# Patient Record
Sex: FEMALE | Race: WHITE | NOT HISPANIC OR LATINO | Employment: STUDENT
[De-identification: names, ages, dates, MRNs, and addresses within clinical notes are randomized per-mention and may not be internally consistent; named-entity substitution may affect disease eponyms.]

---
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
## 2022-08-15 ENCOUNTER — HOSPITAL ENCOUNTER (EMERGENCY)
Facility: HOSPITAL | Age: 22
Discharge: HOME | End: 2022-08-15
Admitting: NURSE PRACTITIONER
Payer: COMMERCIAL

## 2022-08-15 VITALS
WEIGHT: 130 LBS | DIASTOLIC BLOOD PRESSURE: 80 MMHG | TEMPERATURE: 98.9 F | SYSTOLIC BLOOD PRESSURE: 137 MMHG | OXYGEN SATURATION: 98 % | BODY MASS INDEX: 21.66 KG/M2 | RESPIRATION RATE: 16 BRPM | HEIGHT: 65 IN | HEART RATE: 71 BPM

## 2022-08-15 DIAGNOSIS — S61.219A FINGER LACERATION: ICD-10-CM

## 2022-08-15 PROCEDURE — 12001 RPR S/N/AX/GEN/TRNK 2.5CM/<: CPT

## 2022-08-15 PROCEDURE — 99283 EMERGENCY DEPT VISIT LOW MDM: CPT

## 2022-08-15 ASSESSMENT — ENCOUNTER SYMPTOMS: NUMBNESS: 0

## 2022-08-16 NOTE — ED PROVIDER NOTES
EMERGENCY DEPARTMENT ENCOUNTER      NAME: Mary Henry  AGE: 22 year old female  YOB: 2000  MRN: [id number]  EVALUATION DATE & TIME: No admission date for patient encounter.    PCP: No Ref-Primary, Physician    ED PROVIDER: HIMANSHU Lopez, CNP      Chief Complaint   Patient presents with     Laceration     R Pinky finger         FINAL IMPRESSION:  1. Finger laceration          ED COURSE & MEDICAL DECISION MAKIN:55 PM I met with the patient, obtained history, performed an initial exam, and discussed options and plan for treatment here in the ED.  10:16 PM Laceration repaired. Discussed plan for discharge.     Pertinent Labs & Imaging studies reviewed. (See chart for details)  22 year old female presents to the Emergency Department for evaluation of finger laceration. Tetanus UTD. Wound repaired and bandaged. No FB on exam. CMS intact. Given instructions regarding ongoing wound management. Also discussed return precautions.     At the conclusion of the encounter I discussed the results of all of the tests and the disposition. The questions were answered. The patient or family acknowledged understanding and was agreeable with the care plan.       PPE worn: n95 mask.     MEDICATIONS GIVEN IN THE EMERGENCY:  Medications - No data to display    NEW PRESCRIPTIONS STARTED AT TODAY'S ER VISIT  New Prescriptions    No medications on file            =================================================================    HPI    Patient information was obtained from: patient     Use of Intrepreter: N/A       Mary Henry is a 22 year old female otherwise healthy who presents for evaluation of a finger laceration.     Patient presents with a laceration to her right 5th digit after accidentally cutting her finger with a knife while doing dishes this evening. This occurred shortly prior to arrival. She was able to control the bleeding. Patient reports constant pain localized to the affected  digit. Notes some tingling attributed to the pressure bandage. No distal numbness. Denies any other symptoms at this time. Last Tdap 2018. No other complaints or concerns expressed at this time.      REVIEW OF SYSTEMS   Review of Systems   Musculoskeletal:        Positive for right 5th digit pain.   Skin:        Positive for laceration (right 5th digit).   Neurological: Negative for numbness.        Positive for tingling.   All other systems reviewed and are negative.       PAST MEDICAL HISTORY:  Past Medical History:   Diagnosis Date     Foot and mouth disease 7/2004       PAST SURGICAL HISTORY:  History reviewed. No pertinent surgical history.        CURRENT MEDICATIONS:    No current facility-administered medications for this encounter.     No current outpatient medications on file.         ALLERGIES:  Allergies   Allergen Reactions     Penicillins Hives       FAMILY HISTORY:  Family History   Problem Relation Age of Onset     Alcohol/Drug Mother      Allergies Mother      Depression Mother      Breast Cancer Father      Alcohol/Drug Maternal Grandmother      Allergies Maternal Grandmother      Depression Maternal Grandmother      Alcohol/Drug Maternal Grandfather      Allergies Maternal Grandfather      Depression Maternal Grandfather      C.A.D. Paternal Grandmother      Diabetes Paternal Grandmother      Hypertension Paternal Grandmother      Breast Cancer Paternal Grandmother        SOCIAL HISTORY:   Social History     Socioeconomic History     Marital status: Single     Spouse name: None     Number of children: None     Years of education: None     Highest education level: None   Tobacco Use     Smoking status: Never Smoker     Smokeless tobacco: Never Used     Tobacco comment: Not exposed   Substance and Sexual Activity     Alcohol use: No     Drug use: No     Sexual activity: Yes     Partners: Male     Birth control/protection: Condom, Injection         VITALS:  Patient Vitals for the past 24 hrs:   BP  "Temp Temp src Pulse Resp SpO2 Height Weight   08/15/22 2121 137/80 -- -- -- -- -- -- --   08/15/22 2118 -- 98.9  F (37.2  C) Temporal 71 16 98 % 1.651 m (5' 5\") 59 kg (130 lb)       PHYSICAL EXAM    Constitutional:  Well developed, well nourished, no distress   HENT:  Atraumatic  Respiratory:  Normal, nonlabored respirations  Musculoskeletal:  Able to move all digits. All 3 layers of tendon function intact. Intrinsic muscle function of the right thumb intact. Motor and sensory function in the radial, medial, and ulnar distributions intact.  Integument:  Well hydrated, 2 cm U shaped laceration on the pad of the right 5th digit. No tendon involvement. No foreign body.  Neurologic:  Alert and oriented x 3                LAB:  All pertinent labs reviewed and interpreted.  Labs Ordered and Resulted from Time of ED Arrival to Time of ED Departure - No data to display      RADIOLOGY:  Reviewed all pertinent imaging. Please see official radiology report.  No orders to display             PROCEDURES:     PROCEDURE: Digital Block   INDICATIONS: Laceration   PROCEDURE PROVIDER: Luis Fernando Faust CNP   SITE: Right little finger (5th digit)   MEDICATION: 3 mL of 1% Lidocaine without epinephrine   NOTE: The skin overlying the site for injection was prepped with chlorhexidine.  Needle was inserted in a standard three point injection pattern.  Each time the area was aspirated and there was no return of blood.  I then injected the medication at the base of the digit.  The patient had good response to the procedure   COMPLICATIONS: Patient tolerated procedure well, without complication         PROCEDURE: Laceration Repair   INDICATIONS: Laceration   PROCEDURE PROVIDER: Luis Fernando Faust CNP   SITE: Right 5th digit   TYPE/SIZE: subcutaneous, clean and no foreign body visualized  2 cm (total length) U shaped   FUNCTIONAL ASSESSMENT: Distal sensation, circulation and motor intact   MEDICATION: 3 mLs of 1% Lidocaine without epinephrine "   PREPARATION: irrigation with Normal saline   DEBRIDEMENT: no debridement   CLOSURE:  Superficial layer closed with 6 stitches of 4-0 Prolene simple interrupted    Total number of sutures/staples placed: 6               I, Elmo Artis, am serving as a scribe to document services personally performed by Luis Fernando Faust CNP. based on my observation and the provider's statements to me. ILuis Fernando CNP attest that Elmo Artis is acting in a scribe capacity, has observed my performance of the services and has documented them in accordance with my direction.    HIMANSHU Lopez, CNP  Emergency Medicine  [facility] EMERGENCY DEPARTMENT  [address] [place] [id number] [phone]  Dept: [phone]         Luis Fernando Faust APRN CNP  08/15/22 3685

## 2022-08-16 NOTE — DISCHARGE INSTRUCTIONS
We cleaned and closed the wound in the ER today.      TO CARE FOR THE WOUND AT HOME:  Keep the wound CLEAN,DRY and COVERED until the sutures are out.  Your can take over the counter medications for discomfort. Read and follow label directions.  Watch for signs of infection (redness, increasing pain or yellow drainage) and if they develop, come back to the Emergency Department immediately for treatment.  Follow up in clinic in 10 days for suture removal.   ---------------------------------------------------------------------------------------------------

## 2023-06-30 ENCOUNTER — HOSPITAL ENCOUNTER (EMERGENCY)
Facility: CLINIC | Age: 23
Discharge: HOME | End: 2023-06-30
Attending: PHYSICIAN ASSISTANT | Admitting: PHYSICIAN ASSISTANT
Payer: COMMERCIAL

## 2023-06-30 VITALS
RESPIRATION RATE: 16 BRPM | TEMPERATURE: 98.3 F | DIASTOLIC BLOOD PRESSURE: 78 MMHG | OXYGEN SATURATION: 98 % | HEART RATE: 80 BPM | SYSTOLIC BLOOD PRESSURE: 125 MMHG

## 2023-06-30 DIAGNOSIS — J02.9 ACUTE PHARYNGITIS: ICD-10-CM

## 2023-06-30 LAB — GROUP A STREP BY PCR: NOT DETECTED

## 2023-06-30 PROCEDURE — 87651 STREP A DNA AMP PROBE: CPT | Performed by: PHYSICIAN ASSISTANT

## 2023-06-30 PROCEDURE — G0463 HOSPITAL OUTPT CLINIC VISIT: HCPCS | Performed by: PHYSICIAN ASSISTANT

## 2023-06-30 PROCEDURE — 99212 OFFICE O/P EST SF 10 MIN: CPT | Performed by: PHYSICIAN ASSISTANT

## 2023-06-30 ASSESSMENT — ENCOUNTER SYMPTOMS
SORE THROAT: 1
SINUS PRESSURE: 1
FEVER: 0
COUGH: 1
CONSTITUTIONAL NEGATIVE: 1

## 2023-06-30 NOTE — ED PROVIDER NOTES
History     Chief Complaint   Patient presents with     Pharyngitis     Sore throat and sinus pressure. Started yesterday.      HPI  Mary Henry is a 22 year old female who presents with complaints of sore throat, cough, and sinus pressure since yesterday.  Denies fevers, chills, neck pain/stiffness, rash, nausea, vomiting, diarrhea, abdominal pain, chest pain, or shortness of breath.  No known ill contacts.       Allergies:  Allergies   Allergen Reactions     Penicillins Hives       Problem List:    Patient Active Problem List    Diagnosis Date Noted     Cough 05/16/2007     Priority: Medium        Past Medical History:    Past Medical History:   Diagnosis Date     Foot and mouth disease 7/2004       Past Surgical History:    No past surgical history on file.    Family History:    Family History   Problem Relation Age of Onset     Alcohol/Drug Mother      Allergies Mother      Depression Mother      Breast Cancer Father      Alcohol/Drug Maternal Grandmother      Allergies Maternal Grandmother      Depression Maternal Grandmother      Alcohol/Drug Maternal Grandfather      Allergies Maternal Grandfather      Depression Maternal Grandfather      C.A.D. Paternal Grandmother      Diabetes Paternal Grandmother      Hypertension Paternal Grandmother      Breast Cancer Paternal Grandmother        Social History:  Marital Status:  Single [1]  Social History     Tobacco Use     Smoking status: Never     Smokeless tobacco: Never     Tobacco comments:     Not exposed   Substance Use Topics     Alcohol use: No     Drug use: No        Medications:    No current outpatient medications on file.        Review of Systems   Constitutional: Negative.  Negative for fever.   HENT: Positive for congestion, sinus pressure and sore throat.    Respiratory: Positive for cough.    Skin: Negative.    All other systems reviewed and are negative.      Physical Exam   BP: 125/78  Pulse: 80  Temp: 98.3  F (36.8  C)  Resp: 16  SpO2: 98  %      Physical Exam  Constitutional:       General: She is not in acute distress.     Appearance: Normal appearance. She is well-developed. She is not ill-appearing, toxic-appearing or diaphoretic.   HENT:      Head: Normocephalic and atraumatic.      Right Ear: Tympanic membrane, ear canal and external ear normal.      Left Ear: Tympanic membrane, ear canal and external ear normal.      Nose: Mucosal edema, congestion and rhinorrhea present.      Mouth/Throat:      Lips: Pink.      Mouth: Mucous membranes are moist.      Pharynx: Uvula midline. Posterior oropharyngeal erythema present. No pharyngeal swelling, oropharyngeal exudate or uvula swelling.      Tonsils: No tonsillar exudate or tonsillar abscesses.   Eyes:      Extraocular Movements: Extraocular movements intact.      Conjunctiva/sclera: Conjunctivae normal.      Pupils: Pupils are equal, round, and reactive to light.   Cardiovascular:      Rate and Rhythm: Normal rate and regular rhythm.      Heart sounds: Normal heart sounds.   Pulmonary:      Effort: Pulmonary effort is normal. No respiratory distress.      Breath sounds: Normal breath sounds. No stridor. No wheezing, rhonchi or rales.   Musculoskeletal:         General: Normal range of motion.      Cervical back: Full passive range of motion without pain, normal range of motion and neck supple. No rigidity. Normal range of motion.   Lymphadenopathy:      Cervical: No cervical adenopathy.   Skin:     General: Skin is warm and dry.   Neurological:      Mental Status: She is alert and oriented to person, place, and time.   Psychiatric:         Behavior: Behavior is cooperative.         ED Course                 Procedures        Results for orders placed or performed during the hospital encounter of 06/30/23 (from the past 24 hour(s))   Group A Streptococcus PCR Throat Swab    Specimen: Throat; Swab   Result Value Ref Range    Group A strep by PCR Not Detected Not Detected    Narrative    The Xpert  Xpress Strep A test, performed on the [employer], is a rapid, qualitative in vitro diagnostic test for the detection of Streptococcus pyogenes (Group A ß-hemolytic Streptococcus, Strep A) in throat swab specimens from patients with signs and symptoms of pharyngitis. The Xpert Xpress Strep A test can be used as an aid in the diagnosis of Group A Streptococcal pharyngitis. The assay is not intended to monitor treatment for Group A Streptococcus infections. The Xpert Xpress Strep A test utilizes an automated real-time polymerase chain reaction (PCR) to detect Streptococcus pyogenes DNA.       Medications - No data to display    Assessments & Plan (with Medical Decision Making)     Pt is a 22 year old female who presents with complaints of sore throat, cough, and sinus pressure since yesterday.      Pt is afebrile on arrival.  Exam as above.  Strep testing was negative.  Discussed results with patient.  Encouraged symptomatic treatments at home.  Return precautions were reviewed.  Hand-outs were provided.    Patient was instructed to follow-up with PCP for continued care and management.  She is to return to the ED for persistent and/or worsening symptoms.  Patient expressed understanding of the diagnosis and plan and was discharged home in good condition.    I have reviewed the nursing notes.    I have reviewed the findings, diagnosis, plan and need for follow up with the patient.    New Prescriptions    No medications on file       Final diagnoses:   Acute pharyngitis       6/30/2023   [facility] EMERGENCY DEPT      Disclaimer:  This note consists of symbols derived from keyboarding, dictation and/or voice recognition software.  As a result, there may be errors in the script that have gone undetected.  Please consider this when interpreting information found in this chart.     Margarita Goodrich PA-C  06/30/23 1548

## 2025-06-06 ENCOUNTER — HOSPITAL ENCOUNTER (EMERGENCY)
Facility: CLINIC | Age: 25
Discharge: HOME | End: 2025-06-06
Attending: PHYSICIAN ASSISTANT | Admitting: PHYSICIAN ASSISTANT

## 2025-06-06 ENCOUNTER — APPOINTMENT (OUTPATIENT)
Dept: GENERAL RADIOLOGY | Facility: CLINIC | Age: 25
End: 2025-06-06
Attending: PHYSICIAN ASSISTANT

## 2025-06-06 VITALS
DIASTOLIC BLOOD PRESSURE: 73 MMHG | RESPIRATION RATE: 16 BRPM | HEART RATE: 78 BPM | TEMPERATURE: 97.8 F | OXYGEN SATURATION: 99 % | SYSTOLIC BLOOD PRESSURE: 112 MMHG

## 2025-06-06 DIAGNOSIS — J06.9 VIRAL URI WITH COUGH: ICD-10-CM

## 2025-06-06 DIAGNOSIS — J98.01 ACUTE BRONCHOSPASM: ICD-10-CM

## 2025-06-06 PROCEDURE — 71046 X-RAY EXAM CHEST 2 VIEWS: CPT

## 2025-06-06 PROCEDURE — G0463 HOSPITAL OUTPT CLINIC VISIT: HCPCS | Mod: 25

## 2025-06-06 PROCEDURE — 99213 OFFICE O/P EST LOW 20 MIN: CPT | Performed by: PHYSICIAN ASSISTANT

## 2025-06-06 RX ORDER — ALBUTEROL SULFATE 90 UG/1
2 INHALANT RESPIRATORY (INHALATION) EVERY 6 HOURS PRN
Qty: 18 G | Refills: 0 | Status: SHIPPED | OUTPATIENT
Start: 2025-06-06

## 2025-06-06 RX ORDER — PREDNISONE 20 MG/1
TABLET ORAL
Qty: 10 TABLET | Refills: 0 | Status: SHIPPED | OUTPATIENT
Start: 2025-06-06

## 2025-06-06 ASSESSMENT — ENCOUNTER SYMPTOMS
CONSTITUTIONAL NEGATIVE: 1
WHEEZING: 1
COUGH: 1
SHORTNESS OF BREATH: 1
FEVER: 0

## 2025-06-06 ASSESSMENT — ACTIVITIES OF DAILY LIVING (ADL): ADLS_ACUITY_SCORE: 41

## 2025-06-07 NOTE — ED PROVIDER NOTES
History     Chief Complaint   Patient presents with    Cough     HPI  Mary Henry is a 24 year old female who presents to Urgent Care with complaints of cough for the past 5 days.  She states over the past day, she has developed associated wheezing and shortness of breath along with nasal congestion.  Cough is nonproductive.  Patient denies history of asthma or underlying lung disease.  Denies fevers, chills, sore throat, nausea, vomiting, diarrhea, abdominal pain, or chest pain.      Allergies:  Allergies   Allergen Reactions    Penicillins Hives       Problem List:    Patient Active Problem List    Diagnosis Date Noted    Cough 05/16/2007     Priority: Medium        Past Medical History:    Past Medical History:   Diagnosis Date    Foot and mouth disease 7/2004       Past Surgical History:    No past surgical history on file.    Family History:    Family History   Problem Relation Age of Onset    Alcohol/Drug Mother     Allergies Mother     Depression Mother     Breast Cancer Father     Alcohol/Drug Maternal Grandmother     Allergies Maternal Grandmother     Depression Maternal Grandmother     Alcohol/Drug Maternal Grandfather     Allergies Maternal Grandfather     Depression Maternal Grandfather     C.A.D. Paternal Grandmother     Diabetes Paternal Grandmother     Hypertension Paternal Grandmother     Breast Cancer Paternal Grandmother        Social History:  Marital Status:  Single [1]  Social History     Tobacco Use    Smoking status: Never    Smokeless tobacco: Never    Tobacco comments:     Not exposed   Substance Use Topics    Alcohol use: No    Drug use: No        Medications:    albuterol (PROAIR HFA/PROVENTIL HFA/VENTOLIN HFA) 108 (90 Base) MCG/ACT inhaler  predniSONE (DELTASONE) 20 MG tablet          Review of Systems   Constitutional: Negative.  Negative for fever.   HENT:  Positive for congestion.    Respiratory:  Positive for cough, shortness of breath and wheezing.    All other systems  reviewed and are negative.      Physical Exam   BP: 112/73  Pulse: 78  Temp: 97.8  F (36.6  C)  Resp: 16  SpO2: 99 %      Physical Exam  Constitutional:       General: She is not in acute distress.     Appearance: Normal appearance. She is well-developed. She is not ill-appearing, toxic-appearing or diaphoretic.   HENT:      Head: Normocephalic and atraumatic.      Right Ear: Tympanic membrane, ear canal and external ear normal.      Left Ear: Tympanic membrane, ear canal and external ear normal.      Nose: Congestion present. No rhinorrhea.      Mouth/Throat:      Lips: Pink.      Mouth: Mucous membranes are moist.      Pharynx: Oropharynx is clear. Uvula midline. No pharyngeal swelling, oropharyngeal exudate, posterior oropharyngeal erythema, uvula swelling or postnasal drip.      Tonsils: No tonsillar exudate or tonsillar abscesses.   Eyes:      Extraocular Movements: Extraocular movements intact.      Conjunctiva/sclera: Conjunctivae normal.      Pupils: Pupils are equal, round, and reactive to light.   Cardiovascular:      Rate and Rhythm: Normal rate and regular rhythm.      Heart sounds: Normal heart sounds.   Pulmonary:      Effort: Pulmonary effort is normal. No respiratory distress.      Breath sounds: Normal breath sounds. No stridor. No wheezing, rhonchi or rales.   Musculoskeletal:         General: Normal range of motion.      Cervical back: Full passive range of motion without pain, normal range of motion and neck supple. No rigidity. Normal range of motion.   Lymphadenopathy:      Cervical: No cervical adenopathy.   Skin:     General: Skin is warm and dry.   Neurological:      Mental Status: She is alert and oriented to person, place, and time.   Psychiatric:         Behavior: Behavior is cooperative.         ED Course        Procedures    Results for orders placed or performed during the hospital encounter of 06/06/25 (from the past 24 hours)   XR Chest 2 Views    Narrative    EXAM: XR CHEST 2  VIEWS  LOCATION: [facility]  DATE: 6/6/2025    INDICATION: cough, shortness of breath  COMPARISON: 11/20/2007      Impression    IMPRESSION: Negative chest.       Medications - No data to display    Assessments & Plan (with Medical Decision Making)     Pt is a 24 year old female who presents to Urgent Care with complaints of cough for the past 5 days.  She states over the past day, she has developed associated wheezing and shortness of breath along with nasal congestion.  Cough is nonproductive.  Denies history of asthma.    Pt is afebrile on arrival.  Exam as above.  O2 sats of 99% on room air.  Chest x-ray was negative for pneumonia or acute pathology.  Discussed results with patient.  Encouraged symptomatic treatments at home.  Return precautions were reviewed.  Hand-outs were provided.    Patient was sent with prednisone burst and albuterol inhaler and was instructed to follow-up with PCP for continued care and management.  She is to return to the ED for persistent and/or worsening symptoms.  Patient expressed understanding of the diagnosis and plan and was discharged home in good condition.    I have reviewed the nursing notes.    I have reviewed the findings, diagnosis, plan and need for follow up with the patient.    New Prescriptions    ALBUTEROL (PROAIR HFA/PROVENTIL HFA/VENTOLIN HFA) 108 (90 BASE) MCG/ACT INHALER    Inhale 2 puffs into the lungs every 6 hours as needed for shortness of breath, wheezing or cough.    PREDNISONE (DELTASONE) 20 MG TABLET    Take two tablets (= 40mg) each day for 5 (five) days       Final diagnoses:   Viral URI with cough   Acute bronchospasm       6/6/2025   [facility] EMERGENCY DEPT      Disclaimer:  This note consists of symbols derived from keyboarding, dictation and/or voice recognition software.  As a result, there may be errors in the script that have gone undetected.  Please consider this when interpreting information found in  this chart.     Margarita Goodrich PA-C  06/06/25 2025

## 2025-07-14 ENCOUNTER — HOSPITAL ENCOUNTER (EMERGENCY)
Facility: CLINIC | Age: 25
Discharge: HOME | End: 2025-07-14

## 2025-07-14 VITALS
WEIGHT: 140 LBS | SYSTOLIC BLOOD PRESSURE: 124 MMHG | HEART RATE: 71 BPM | RESPIRATION RATE: 16 BRPM | HEIGHT: 65 IN | TEMPERATURE: 98.4 F | OXYGEN SATURATION: 99 % | DIASTOLIC BLOOD PRESSURE: 82 MMHG | BODY MASS INDEX: 23.32 KG/M2

## 2025-07-14 DIAGNOSIS — R10.9 ABDOMINAL PAIN, UNSPECIFIED ABDOMINAL LOCATION: ICD-10-CM

## 2025-07-14 DIAGNOSIS — R19.8 ALTERNATING CONSTIPATION AND DIARRHEA: ICD-10-CM

## 2025-07-14 LAB
ALBUMIN SERPL BCG-MCNC: 4.1 G/DL (ref 3.5–5.2)
ALBUMIN UR-MCNC: NEGATIVE MG/DL
ALP SERPL-CCNC: 47 U/L (ref 40–150)
ALT SERPL W P-5'-P-CCNC: 13 U/L (ref 0–50)
ANION GAP SERPL CALCULATED.3IONS-SCNC: 10 MMOL/L (ref 7–15)
APPEARANCE UR: CLEAR
AST SERPL W P-5'-P-CCNC: 16 U/L (ref 0–45)
BACTERIA #/AREA URNS HPF: ABNORMAL /HPF
BASOPHILS # BLD AUTO: 0.1 10E3/UL (ref 0–0.2)
BASOPHILS NFR BLD AUTO: 1 %
BILIRUB SERPL-MCNC: 1.2 MG/DL
BILIRUB UR QL STRIP: NEGATIVE
BUN SERPL-MCNC: 10 MG/DL (ref 6–20)
CALCIUM SERPL-MCNC: 9.6 MG/DL (ref 8.8–10.4)
CHLORIDE SERPL-SCNC: 105 MMOL/L (ref 98–107)
COLOR UR AUTO: ABNORMAL
CREAT SERPL-MCNC: 0.82 MG/DL (ref 0.51–0.95)
CRP SERPL-MCNC: <3 MG/L
EGFRCR SERPLBLD CKD-EPI 2021: >90 ML/MIN/1.73M2
EOSINOPHIL # BLD AUTO: 0 10E3/UL (ref 0–0.7)
EOSINOPHIL NFR BLD AUTO: 1 %
ERYTHROCYTE [DISTWIDTH] IN BLOOD BY AUTOMATED COUNT: 12.2 % (ref 10–15)
GLUCOSE SERPL-MCNC: 94 MG/DL (ref 70–99)
GLUCOSE UR STRIP-MCNC: NEGATIVE MG/DL
HCG UR QL: NEGATIVE
HCO3 SERPL-SCNC: 24 MMOL/L (ref 22–29)
HCT VFR BLD AUTO: 37 % (ref 35–47)
HGB BLD-MCNC: 13.1 G/DL (ref 11.7–15.7)
HGB UR QL STRIP: NEGATIVE
HOLD SPECIMEN: NORMAL
IMM GRANULOCYTES # BLD: 0 10E3/UL
IMM GRANULOCYTES NFR BLD: 0 %
KETONES UR STRIP-MCNC: NEGATIVE MG/DL
LEUKOCYTE ESTERASE UR QL STRIP: NEGATIVE
LIPASE SERPL-CCNC: 20 U/L (ref 13–60)
LYMPHOCYTES # BLD AUTO: 1.6 10E3/UL (ref 0.8–5.3)
LYMPHOCYTES NFR BLD AUTO: 23 %
MCH RBC QN AUTO: 32.2 PG (ref 26.5–33)
MCHC RBC AUTO-ENTMCNC: 35.4 G/DL (ref 31.5–36.5)
MCV RBC AUTO: 91 FL (ref 78–100)
MONOCYTES # BLD AUTO: 0.5 10E3/UL (ref 0–1.3)
MONOCYTES NFR BLD AUTO: 7 %
MUCOUS THREADS #/AREA URNS LPF: PRESENT /LPF
NEUTROPHILS # BLD AUTO: 4.8 10E3/UL (ref 1.6–8.3)
NEUTROPHILS NFR BLD AUTO: 68 %
NITRATE UR QL: NEGATIVE
NRBC # BLD AUTO: 0 10E3/UL
NRBC BLD AUTO-RTO: 0 /100
PH UR STRIP: 6.5 [PH] (ref 5–7)
PLATELET # BLD AUTO: 251 10E3/UL (ref 150–450)
POTASSIUM SERPL-SCNC: 4.1 MMOL/L (ref 3.4–5.3)
PROT SERPL-MCNC: 6.8 G/DL (ref 6.4–8.3)
RBC # BLD AUTO: 4.07 10E6/UL (ref 3.8–5.2)
RBC URINE: 2 /HPF
SODIUM SERPL-SCNC: 139 MMOL/L (ref 135–145)
SP GR UR STRIP: 1.02 (ref 1–1.03)
UROBILINOGEN UR STRIP-MCNC: NORMAL MG/DL
WBC # BLD AUTO: 7 10E3/UL (ref 4–11)
WBC URINE: 1 /HPF

## 2025-07-14 PROCEDURE — 83690 ASSAY OF LIPASE: CPT

## 2025-07-14 PROCEDURE — 84155 ASSAY OF PROTEIN SERUM: CPT

## 2025-07-14 PROCEDURE — 85025 COMPLETE CBC W/AUTO DIFF WBC: CPT

## 2025-07-14 PROCEDURE — 99283 EMERGENCY DEPT VISIT LOW MDM: CPT

## 2025-07-14 PROCEDURE — 36415 COLL VENOUS BLD VENIPUNCTURE: CPT

## 2025-07-14 PROCEDURE — 81025 URINE PREGNANCY TEST: CPT

## 2025-07-14 PROCEDURE — 99284 EMERGENCY DEPT VISIT MOD MDM: CPT

## 2025-07-14 PROCEDURE — 86140 C-REACTIVE PROTEIN: CPT

## 2025-07-14 PROCEDURE — 81003 URINALYSIS AUTO W/O SCOPE: CPT

## 2025-07-14 RX ORDER — ONDANSETRON 4 MG/1
4 TABLET, ORALLY DISINTEGRATING ORAL EVERY 8 HOURS PRN
Qty: 10 TABLET | Refills: 0 | Status: SHIPPED | OUTPATIENT
Start: 2025-07-14

## 2025-07-14 ASSESSMENT — ACTIVITIES OF DAILY LIVING (ADL)
ADLS_ACUITY_SCORE: 41

## 2025-07-14 ASSESSMENT — COLUMBIA-SUICIDE SEVERITY RATING SCALE - C-SSRS
1. IN THE PAST MONTH, HAVE YOU WISHED YOU WERE DEAD OR WISHED YOU COULD GO TO SLEEP AND NOT WAKE UP?: NO
6. HAVE YOU EVER DONE ANYTHING, STARTED TO DO ANYTHING, OR PREPARED TO DO ANYTHING TO END YOUR LIFE?: NO
2. HAVE YOU ACTUALLY HAD ANY THOUGHTS OF KILLING YOURSELF IN THE PAST MONTH?: NO

## 2025-07-14 NOTE — ED TRIAGE NOTES
Left sided flank and abdominal pain, ongoing since illness in June; reports some intermittent nausea and vomiting; no change in bladder, change in bowel movements rotating between constipation and diarrhea     Triage Assessment (Adult)       Row Name 07/14/25 1203          Triage Assessment    Airway WDL WDL        Cardiac WDL    Cardiac WDL WDL        Cognitive/Neuro/Behavioral WDL    Cognitive/Neuro/Behavioral WDL WDL

## 2025-07-14 NOTE — DISCHARGE INSTRUCTIONS
I am not sure what is causing your abdominal pain and stool changes. Your testing today did not show signs of a life-threatening or emergent cause, but I recommend scheduling a follow up appointment in the clinic to get further testing completed.     If you develop constipation you can use over the counter miralax as needed (this can cause an increase in gas/bloating); follow dosing instructions on the bottle. Do not use if you are having loose or watery stools.     Keep a food diary and make note if certain foods seem to cause worsening symptoms.     Return to the ER if you develop severe abdominal pain, uncontrollable vomiting, fever, bloody or black stools, or if other concerning symptoms develop.

## 2025-07-14 NOTE — ED PROVIDER NOTES
History     Chief Complaint   Patient presents with    Abdominal Pain       Mary Henry is a 24 year old female with no significant pmhx who presents for evaluation of abdominal pain.  Patient states she has been having on and off episodes of left-sided abdominal pain and has been rotating between diarrhea and constipation for the last couple months.  She also describes feeling more gassy/bubbly, with intermittent nausea but only one episode of vomiting.  She denies associated fever, frequent nausea/vomiting, flank pain, dysuria, hematuria, melena, hematochezia.  No history of abdominal surgeries, kidney stones.  She has not made any primary care appointments and has not been seen for this before.    Allergies:  Allergies   Allergen Reactions    Penicillins Hives       Problem List:    Patient Active Problem List    Diagnosis Date Noted    Cough 05/16/2007     Priority: Medium        Past Medical History:    Past Medical History:   Diagnosis Date    Foot and mouth disease 7/2004       Past Surgical History:    No past surgical history on file.    Family History:    Family History   Problem Relation Age of Onset    Alcohol/Drug Mother     Allergies Mother     Depression Mother     Breast Cancer Father     Alcohol/Drug Maternal Grandmother     Allergies Maternal Grandmother     Depression Maternal Grandmother     Alcohol/Drug Maternal Grandfather     Allergies Maternal Grandfather     Depression Maternal Grandfather     C.A.D. Paternal Grandmother     Diabetes Paternal Grandmother     Hypertension Paternal Grandmother     Breast Cancer Paternal Grandmother        Social History:  Marital Status:  Single [1]  Social History     Tobacco Use    Smoking status: Never    Smokeless tobacco: Never    Tobacco comments:     Not exposed   Substance Use Topics    Alcohol use: No    Drug use: No        Medications:    albuterol (PROAIR HFA/PROVENTIL HFA/VENTOLIN HFA) 108 (90 Base) MCG/ACT inhaler  ondansetron (ZOFRAN ODT)  "4 MG ODT tab  predniSONE (DELTASONE) 20 MG tablet          Physical Exam   BP: 124/82  Pulse: 71  Temp: 98.4  F (36.9  C)  Resp: 16  Height: 165.1 cm (5' 5\")  Weight: 63.5 kg (140 lb)  SpO2: 99 %      Physical Exam  Vitals and nursing note reviewed.   Constitutional:       General: She is not in acute distress.     Appearance: She is not ill-appearing, toxic-appearing or diaphoretic.   HENT:      Head: Normocephalic and atraumatic.   Eyes:      Extraocular Movements: Extraocular movements intact.      Pupils: Pupils are equal, round, and reactive to light.   Cardiovascular:      Rate and Rhythm: Normal rate and regular rhythm.      Pulses: Normal pulses.   Pulmonary:      Effort: Pulmonary effort is normal.   Abdominal:      Palpations: Abdomen is soft.      Tenderness: There is no abdominal tenderness. There is no guarding or rebound.   Musculoskeletal:         General: Normal range of motion.      Cervical back: Normal range of motion.   Skin:     General: Skin is warm.   Neurological:      General: No focal deficit present.      Mental Status: She is alert and oriented to person, place, and time.   Psychiatric:         Mood and Affect: Mood normal.         Behavior: Behavior normal.         ED Course        Procedures                    Recent Results (from the past 24 hours)   CBC with platelets, differential    Narrative    The following orders were created for panel order CBC with platelets, differential.  Procedure                               Abnormality         Status                     ---------                               -----------         ------                     CBC with platelets and ...[[id number]]                      Final result                 Please view results for these tests on the individual orders.   Comprehensive metabolic panel   Result Value Ref Range    Sodium 139 135 - 145 mmol/L    Potassium 4.1 3.4 - 5.3 mmol/L    Carbon Dioxide (CO2) 24 22 - 29 mmol/L    Anion Gap 10 7 - 15 " mmol/L    Urea Nitrogen 10.0 6.0 - 20.0 mg/dL    Creatinine 0.82 0.51 - 0.95 mg/dL    GFR Estimate >90 >60 mL/min/1.73m2    Calcium 9.6 8.8 - 10.4 mg/dL    Chloride 105 98 - 107 mmol/L    Glucose 94 70 - 99 mg/dL    Alkaline Phosphatase 47 40 - 150 U/L    AST 16 0 - 45 U/L    ALT 13 0 - 50 U/L    Protein Total 6.8 6.4 - 8.3 g/dL    Albumin 4.1 3.5 - 5.2 g/dL    Bilirubin Total 1.2 <=1.2 mg/dL   Lipase   Result Value Ref Range    Lipase 20 13 - 60 U/L   [facility] Draw    Narrative    The following orders were created for panel order [place] Draw.  Procedure                               Abnormality         Status                     ---------                               -----------         ------                     Extra Red Top Tube[[id number]]                              Final result                 Please view results for these tests on the individual orders.   CBC with platelets and differential   Result Value Ref Range    WBC Count 7.0 4.0 - 11.0 10e3/uL    RBC Count 4.07 3.80 - 5.20 10e6/uL    Hemoglobin 13.1 11.7 - 15.7 g/dL    Hematocrit 37.0 35.0 - 47.0 %    MCV 91 78 - 100 fL    MCH 32.2 26.5 - 33.0 pg    MCHC 35.4 31.5 - 36.5 g/dL    RDW 12.2 10.0 - 15.0 %    Platelet Count 251 150 - 450 10e3/uL    % Neutrophils 68 %    % Lymphocytes 23 %    % Monocytes 7 %    % Eosinophils 1 %    % Basophils 1 %    % Immature Granulocytes 0 %    NRBCs per 100 WBC 0 <1 /100    Absolute Neutrophils 4.8 1.6 - 8.3 10e3/uL    Absolute Lymphocytes 1.6 0.8 - 5.3 10e3/uL    Absolute Monocytes 0.5 0.0 - 1.3 10e3/uL    Absolute Eosinophils 0.0 0.0 - 0.7 10e3/uL    Absolute Basophils 0.1 0.0 - 0.2 10e3/uL    Absolute Immature Granulocytes 0.0 <=0.4 10e3/uL    Absolute NRBCs 0.0 10e3/uL   Extra Red Top Tube   Result Value Ref Range    Hold Specimen [facility]    CRP inflammation   Result Value Ref Range    CRP Inflammation <3.00 <5.00 mg/L   UA with Microscopic reflex to Culture    Specimen: Urine, Midstream   Result Value Ref Range     Color Urine Light Yellow Colorless, Straw, Light Yellow, Yellow    Appearance Urine Clear Clear    Glucose Urine Negative Negative mg/dL    Bilirubin Urine Negative Negative    Ketones Urine Negative Negative mg/dL    Specific Gravity Urine 1.023 1.003 - 1.035    Blood Urine Negative Negative    pH Urine 6.5 5.0 - 7.0    Protein Albumin Urine Negative Negative mg/dL    Urobilinogen Urine Normal Normal mg/dL    Nitrite Urine Negative Negative    Leukocyte Esterase Urine Negative Negative    Bacteria Urine Few (A) None Seen /HPF    Mucus Urine Present (A) None Seen /LPF    RBC Urine 2 <=2 /HPF    WBC Urine 1 <=5 /HPF    Narrative    Urine Culture not indicated   HCG qualitative urine (UPT)   Result Value Ref Range    hCG Urine Qualitative Negative Negative       Medications - No data to display    Assessments & Plan (with Medical Decision Making)     I have reviewed the nursing notes.    I have reviewed the findings, diagnosis, plan and need for follow up with the patient.    Medical Decision Making  Mary Henry is a 24 year old female with no significant pmhx who presents for evaluation of abdominal pain.  Differential diagnoses include appendicitis, pancreatitis, gallbladder pathology, urolithiasis, diverticulitis, IBD, IBS.  Vital signs within normal limits.  Patient is normotensive, afebrile, she is not tachycardic, and she is satting 99% on room air with regular respiratory rate and effort.    On examination patient is alert, oriented, no acute distress.  She is sitting up on the bed comfortably.  Abdomen is soft and nontender, no guarding or rebound. CBC negative for leukocytosis or anemia. CMP negative for electrolyte abnormality, renal dysfunction, liver dysfunction. Lipase is WNL which lowers suspicion for acute pancreatic pathology. CRP is undetectably low which lowers suspicion for acute intraabdominal infection/inflammation or IBD. UA negative for signs of infection, no RBC suggestive of  urolithiasis. Urine pregnancy test is negative.     Overall work up is grossly unremarkable, and with a  benign abdominal examination and normal vitals there is low suspicion for acute intraabdominal pathology warranting further workup/imaging today. Low suspicion for gastritis/PUD as patient does not have epigastric pain or pain associated with eating. Symptoms could be related to food intolerance/allergy, IBD, IBS. Recommended miralax as needed for constipation, zofran as needed for nausea. Recommended close follow up with primary care for further testing. She was given strict return precautions should she develop severe pain, uncontrollable vomiting, fever, black or bloody stools, or if other concerning symptoms develop. Patient voiced understanding of the plan and had no further questions.       New Prescriptions    ONDANSETRON (ZOFRAN ODT) 4 MG ODT TAB    Take 1 tablet (4 mg) by mouth every 8 hours as needed for nausea.       Final diagnoses:   Alternating constipation and diarrhea   Abdominal pain, unspecified abdominal location       Joanne Law PA-C  July 14, 2025  [facility] EMERGENCY DEPT     Joanne Law PA-C  07/14/25 1197

## 2025-07-22 ENCOUNTER — OFFICE VISIT (OUTPATIENT)
Dept: FAMILY MEDICINE | Facility: CLINIC | Age: 25
End: 2025-07-22

## 2025-07-22 VITALS
SYSTOLIC BLOOD PRESSURE: 114 MMHG | HEART RATE: 72 BPM | BODY MASS INDEX: 22.84 KG/M2 | OXYGEN SATURATION: 99 % | RESPIRATION RATE: 16 BRPM | WEIGHT: 137.1 LBS | TEMPERATURE: 97.7 F | HEIGHT: 65 IN | DIASTOLIC BLOOD PRESSURE: 78 MMHG

## 2025-07-22 DIAGNOSIS — E04.9 PALPABLE THYROID: ICD-10-CM

## 2025-07-22 DIAGNOSIS — R10.32 LLQ ABDOMINAL PAIN: ICD-10-CM

## 2025-07-22 DIAGNOSIS — R19.8 ALTERNATING CONSTIPATION AND DIARRHEA: Primary | ICD-10-CM

## 2025-07-22 LAB — TSH SERPL DL<=0.005 MIU/L-ACNC: 1.28 UIU/ML (ref 0.3–4.2)

## 2025-07-22 PROCEDURE — 84443 ASSAY THYROID STIM HORMONE: CPT | Performed by: FAMILY MEDICINE

## 2025-07-22 PROCEDURE — 99204 OFFICE O/P NEW MOD 45 MIN: CPT | Performed by: FAMILY MEDICINE

## 2025-07-22 PROCEDURE — 86364 TISS TRNSGLTMNASE EA IG CLAS: CPT | Performed by: FAMILY MEDICINE

## 2025-07-22 PROCEDURE — 36415 COLL VENOUS BLD VENIPUNCTURE: CPT | Performed by: FAMILY MEDICINE

## 2025-07-22 PROCEDURE — G2211 COMPLEX E/M VISIT ADD ON: HCPCS | Performed by: FAMILY MEDICINE

## 2025-07-22 ASSESSMENT — ENCOUNTER SYMPTOMS
CONSTIPATION: 1
DIARRHEA: 1

## 2025-07-22 ASSESSMENT — PATIENT HEALTH QUESTIONNAIRE - PHQ9
10. IF YOU CHECKED OFF ANY PROBLEMS, HOW DIFFICULT HAVE THESE PROBLEMS MADE IT FOR YOU TO DO YOUR WORK, TAKE CARE OF THINGS AT HOME, OR GET ALONG WITH OTHER PEOPLE: NOT DIFFICULT AT ALL
SUM OF ALL RESPONSES TO PHQ QUESTIONS 1-9: 1
SUM OF ALL RESPONSES TO PHQ QUESTIONS 1-9: 1

## 2025-07-22 ASSESSMENT — PAIN SCALES - GENERAL: PAINLEVEL_OUTOF10: MILD PAIN (3)

## 2025-07-22 NOTE — PROGRESS NOTES
Assessment & Plan     Alternating constipation and diarrhea  Unclear etiology.  Still considering IBD, IBS, occult infection, food intolerance, celiac disease  Additional testing discussed with patient. She agrees to pursue below.  If all tests negative, consider GI consult.  - Primary Care Referral  - Tissue transglutaminase donny IgA and IgG  - Enteric Bacteria and Virus Panel by [facility] Stool  - Tissue transglutaminase donny IgA and IgG    LLQ abdominal pain  Persistent per patient. Work up in ER did not show signs of inflammation.  Unclear etiology.  Testing as below to rule out occult conditions.  Consider cT abdomen/pelvis if persistent beyond next 1-2 weeks and negative additional testing.  Reasons to go to ER discussed in detail with patient.   - Tissue transglutaminase donny IgA and IgG  - Enteric Bacteria and Virus Panel by [facility] Stool  - Tissue transglutaminase donny IgA and IgG    Palpable thyroid  Incidentally found on exam. Asymptomatic and no known hx of thyroid disease.  Discussed initial eval for this. She agrees to proceed.  - TSH with free T4 reflex  - US Thyroid  - TSH with free T4 reflex    The longitudinal plan of care for the diagnosis(es)/condition(s) as documented were addressed during this visit. Due to the added complexity in care, I will continue to support Mary in the subsequent management and with ongoing continuity of care.    MED REC REQUIRED  Post Medication Reconciliation Status: discharge medications reconciled, continue medications without change  Follow-up   Return in about 2 weeks (around 8/5/2025) for In-clinic visit for persitent symptoms.        Subjective   Mary is a 24 year old, presenting for the following health issues:  Diarrhea (X1 months), ER F/U, Constipation (Pt believes this to be due to taking OTC Anti diarrhea medication.), and Abdominal Pain (LLQ x1 month)        7/22/2025     9:16 AM   Additional Questions   Roomed by Kelly WASHINGTON MA   Accompanied by self          "7/22/2025     9:16 AM   Patient Reported Additional Medications   Patient reports taking the following new medications Immodium prn     Diarrhea    Constipation             ED/UC Followup:    Facility:  [facility]  Date of visit: 7/14/2025  Reason for visit: Abdominal pain, diarrhea, constipation   Current Status: Unchanged  Work up in ER did not show inflammation signs.    Diarrhea  Onset: x1 months  Description:   Consistency of stool: predominantly loose stools; constipation is less.  Blood in stool: no   Number of loose stools in past 24 hours: 3  Progression of Symptoms:  same  Accompanying Signs & Symptoms:  Fever: no   Nausea or vomiting; no   Abdominal pain: YES- LLQ  Episodes of constipation: YES- After taking Imodium  Weight loss: no  Rash: none  History:   Ill contacts: no   Recent use of antibiotics: no    Recent travels: no          Recent medication-new or changes(Rx or OTC): no   Viral lung infection before she developed the BM changes. No abx given then.  Precipitating factors:   Possibly anxiety, after eating anything, tries cutting out caffeine, dairy, alcohol with no relief.   Patient denies any type of food as predominant trigger.  Alleviating factors:   Imodium helps but then causes constipation    Therapies Tried and outcome:  Imodium AD; Outcome: Helps but causes constipation.     Since ER visit, still pain in LLQ - dull constant pain, waxes and wanes into sharp pain.    Patient denies similar episodes before.        Review of Systems  Constitutional, HEENT, cardiovascular, pulmonary, GI, , musculoskeletal, neuro, skin, endocrine and psych systems are negative, except as otherwise noted.      Objective    /78 (BP Location: Right arm, Patient Position: Sitting, Cuff Size: Adult Regular)   Pulse 72   Temp 97.7  F (36.5  C) (Tympanic)   Resp 16   Ht 1.661 m (5' 5.4\")   Wt 62.2 kg (137 lb 1.6 oz)   LMP 07/14/2025 (Within Days)   SpO2 99%   BMI 22.54 " kg/m    Body mass index is 22.54 kg/m .  Physical Exam   GENERAL: borderline underweight, ambulatory w/o assist , alert and no distress  EYES: no icterus  NECK: thyroid somewhat visibly prominent; on palpation, thyroid slightly palpable but symmetric and nontender; no palpable LAD  RESP: lungs clear to auscultation - no rales, no rhonchi, no wheezes  CV: regular rates and rhythm, normal S1 S2, no S3 or S4 and no murmur, no click or rub  ABD: flat abdomen, no skin changes, mild LLQ TTP with referred tenderness on the RLQ, no palpable mass, no guarding, no Cueto's sign, no palpable organomegaly  MS: extremities- no gross deformities noted, no edema  SKIN: good turgor, no jaundice or rash     Admission on 07/14/2025, Discharged on 07/14/2025   Component Date Value Ref Range Status    Sodium 07/14/2025 139  135 - 145 mmol/L Final    Potassium 07/14/2025 4.1  3.4 - 5.3 mmol/L Final    Carbon Dioxide (CO2) 07/14/2025 24  22 - 29 mmol/L Final    Anion Gap 07/14/2025 10  7 - 15 mmol/L Final    Urea Nitrogen 07/14/2025 10.0  6.0 - 20.0 mg/dL Final    Creatinine 07/14/2025 0.82  0.51 - 0.95 mg/dL Final    GFR Estimate 07/14/2025 >90  >60 mL/min/1.73m2 Final    eGFR calculated using 2021 CKD-EPI equation.    Calcium 07/14/2025 9.6  8.8 - 10.4 mg/dL Final    Chloride 07/14/2025 105  98 - 107 mmol/L Final    Glucose 07/14/2025 94  70 - 99 mg/dL Final    Alkaline Phosphatase 07/14/2025 47  40 - 150 U/L Final    AST 07/14/2025 16  0 - 45 U/L Final    ALT 07/14/2025 13  0 - 50 U/L Final    Protein Total 07/14/2025 6.8  6.4 - 8.3 g/dL Final    Albumin 07/14/2025 4.1  3.5 - 5.2 g/dL Final    Bilirubin Total 07/14/2025 1.2  <=1.2 mg/dL Final    Lipase 07/14/2025 20  13 - 60 U/L Final    WBC Count 07/14/2025 7.0  4.0 - 11.0 10e3/uL Final    RBC Count 07/14/2025 4.07  3.80 - 5.20 10e6/uL Final    Hemoglobin 07/14/2025 13.1  11.7 - 15.7 g/dL Final    Hematocrit 07/14/2025 37.0  35.0 - 47.0 % Final    MCV 07/14/2025 91  78 - 100 fL Final     MCH 07/14/2025 32.2  26.5 - 33.0 pg Final    MCHC 07/14/2025 35.4  31.5 - 36.5 g/dL Final    RDW 07/14/2025 12.2  10.0 - 15.0 % Final    Platelet Count 07/14/2025 251  150 - 450 10e3/uL Final    % Neutrophils 07/14/2025 68  % Final    % Lymphocytes 07/14/2025 23  % Final    % Monocytes 07/14/2025 7  % Final    % Eosinophils 07/14/2025 1  % Final    % Basophils 07/14/2025 1  % Final    % Immature Granulocytes 07/14/2025 0  % Final    NRBCs per 100 WBC 07/14/2025 0  <1 /100 Final    Absolute Neutrophils 07/14/2025 4.8  1.6 - 8.3 10e3/uL Final    Absolute Lymphocytes 07/14/2025 1.6  0.8 - 5.3 10e3/uL Final    Absolute Monocytes 07/14/2025 0.5  0.0 - 1.3 10e3/uL Final    Absolute Eosinophils 07/14/2025 0.0  0.0 - 0.7 10e3/uL Final    Absolute Basophils 07/14/2025 0.1  0.0 - 0.2 10e3/uL Final    Absolute Immature Granulocytes 07/14/2025 0.0  <=0.4 10e3/uL Final    Absolute NRBCs 07/14/2025 0.0  10e3/uL Final    Hold Specimen 07/14/2025 [facility]   Final    Color Urine 07/14/2025 Light Yellow  Colorless, Straw, Light Yellow, Yellow Final    Appearance Urine 07/14/2025 Clear  Clear Final    Glucose Urine 07/14/2025 Negative  Negative mg/dL Final    Bilirubin Urine 07/14/2025 Negative  Negative Final    Ketones Urine 07/14/2025 Negative  Negative mg/dL Final    Specific Gravity Urine 07/14/2025 1.023  1.003 - 1.035 Final    Blood Urine 07/14/2025 Negative  Negative Final    pH Urine 07/14/2025 6.5  5.0 - 7.0 Final    Protein Albumin Urine 07/14/2025 Negative  Negative mg/dL Final    Urobilinogen Urine 07/14/2025 Normal  Normal mg/dL Final    Nitrite Urine 07/14/2025 Negative  Negative Final    Leukocyte Esterase Urine 07/14/2025 Negative  Negative Final    Bacteria Urine 07/14/2025 Few (A)  None Seen /HPF Final    Mucus Urine 07/14/2025 Present (A)  None Seen /LPF Final    RBC Urine 07/14/2025 2  <=2 /HPF Final    WBC Urine 07/14/2025 1  <=5 /HPF Final    hCG Urine Qualitative 07/14/2025 Negative  Negative Final    This test  is for screening purposes.  Results should be interpreted along with the clinical picture.  Confirmation testing is available if warranted by ordering [id number], HCG Quantitative Pregnancy.    CRP Inflammation 07/14/2025 <3.00  <5.00 mg/L Final           Signed Electronically by: Carlos Luis MD    Answers submitted by the patient for this visit:  Patient Health Questionnaire (Submitted on 7/22/2025)  If you checked off any problems, how difficult have these problems made it for you to do your work, take care of things at home, or get along with other people?: Not difficult at all  PHQ9 TOTAL SCORE: 1

## 2025-07-23 ENCOUNTER — LAB (OUTPATIENT)
Dept: LAB | Facility: CLINIC | Age: 25
End: 2025-07-23

## 2025-07-23 DIAGNOSIS — R10.32 LLQ ABDOMINAL PAIN: ICD-10-CM

## 2025-07-23 DIAGNOSIS — R19.8 ALTERNATING CONSTIPATION AND DIARRHEA: ICD-10-CM

## 2025-07-23 LAB

## 2025-07-23 PROCEDURE — 87507 IADNA-DNA/RNA PROBE TQ 12-25: CPT
